# Patient Record
Sex: FEMALE | Race: WHITE | NOT HISPANIC OR LATINO | URBAN - METROPOLITAN AREA
[De-identification: names, ages, dates, MRNs, and addresses within clinical notes are randomized per-mention and may not be internally consistent; named-entity substitution may affect disease eponyms.]

---
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
## 2023-08-07 ENCOUNTER — OFFICE VISIT (OUTPATIENT)
Dept: OBSTETRICS AND GYNECOLOGY | Age: 18
End: 2023-08-07
Payer: COMMERCIAL

## 2023-08-07 VITALS
DIASTOLIC BLOOD PRESSURE: 68 MMHG | HEIGHT: 68 IN | WEIGHT: 174 LBS | BODY MASS INDEX: 26.37 KG/M2 | SYSTOLIC BLOOD PRESSURE: 110 MMHG

## 2023-08-07 DIAGNOSIS — N90.89 VULVAR LESION: ICD-10-CM

## 2023-08-07 DIAGNOSIS — Z01.419 ENCOUNTER FOR GYNECOLOGICAL EXAMINATION WITHOUT ABNORMAL FINDING: Primary | ICD-10-CM

## 2023-08-07 DIAGNOSIS — Z11.8 SCREENING FOR CHLAMYDIAL DISEASE: ICD-10-CM

## 2023-08-07 PROCEDURE — 99395 PREV VISIT EST AGE 18-39: CPT | Performed by: OBSTETRICS & GYNECOLOGY

## 2023-08-07 NOTE — PROGRESS NOTES
Routine Annual Visit    2023    Patient: Zoey Jaime          MR#:[id number]      Chief Complaint   Patient presents with    Gynecologic Exam     Annual Exam - last AE 22, pt would like to discuss IUD options, pt has no complaints today       History of Present Illness    17 y.o. female  who presents for annual exam.     Ocps, doing ok but skips a lot of pills, up to 5 a month  C/o paper cut type lesion around clitoral area, no cold sores  No lesion today but HSV would be in differential   Ok with chlamydia screen  3rd year in [employer] in [place]  Wants to switch to IUD      Patient's last menstrual period was 2023 (exact date).  Obstetric History:  OB History          0    Para   0    Term   0       0    AB   0    Living   0         SAB   0    IAB   0    Ectopic   0    Molar   0    Multiple   0    Live Births   0               Menstrual History:     Patient's last menstrual period was 2023 (exact date).       Sexual History:       ________________________________________  There is no problem list on file for this patient.      Past Medical History:   Diagnosis Date    Anxiety 2019       Past Surgical History:   Procedure Laterality Date    WISDOM TOOTH EXTRACTION  2022       Social History     Tobacco Use   Smoking Status Never   Smokeless Tobacco Never       has a current medication list which includes the following prescription(s): doxycycline and norgestimate-ethinyl estradiol.  ________________________________________    Current contraception: OCP (estrogen/progesterone)  History of abnormal Pap smear: no  Family history of Breast cancer: no         The following portions of the patient's history were reviewed and updated as appropriate: allergies, current medications, past family history, past medical history, past social history, past surgical history, and problem list.    Review of Systems    Pertinent items are noted in HPI.     Objective   Physical  "Exam    /68   Ht 172.7 cm (68\")   Wt 78.9 kg (174 lb)   LMP 08/02/2023 (Exact Date)   BMI 26.46 kg/mý    BP Readings from Last 3 Encounters:   08/07/23 110/68   08/01/22 118/64   02/26/21 104/62 (23 %, Z = -0.74 /  29 %, Z = -0.55)*     *BP percentiles are based on the 2017 AAP Clinical Practice Guideline for girls      Wt Readings from Last 3 Encounters:   08/07/23 78.9 kg (174 lb) (93 %, Z= 1.46)*   08/01/22 75.8 kg (167 lb) (91 %, Z= 1.37)*   02/26/21 76.7 kg (169 lb) (93 %, Z= 1.50)*     * Growth percentiles are based on [facility] (Girls, 2-20 Years) data.      BMI: Estimated body mass index is 26.46 kg/mý as calculated from the following:    Height as of this encounter: 172.7 cm (68\").    Weight as of this encounter: 78.9 kg (174 lb).      General:   alert, appears stated age, and cooperative   Abdomen: soft, non-tender, without masses or organomegaly   Breast: inspection negative, no nipple discharge or bleeding, no masses or nodularity palpable   Vulva: normal   Vagina: normal mucosa   Cervix: no cervical motion tenderness and no lesions   Uterus: normal size, non-tender, and deferred   Adnexa: deferred     Assessment:    1. Normal annual exam   Assessment     ICD-10-CM ICD-9-CM   1. Encounter for gynecological examination without abnormal finding  Z01.419 V72.31   2. Vulvar lesion  N90.89 624.8   3. Screening for chlamydial disease  Z11.8 V73.98     Plan:    Plan       []  PAP done  []  Labs:   []  GC/Chl/TV          Diagnoses and all orders for this visit:    1. Encounter for gynecological examination without abnormal finding (Primary)    2. Vulvar lesion  -     HSV 1 & 2 - Specific Antibody, IgG    3. Screening for chlamydial disease  -     Chlamydia trachomatis, Neisseria gonorrhoeae, Trichomonas vaginalis, PCR - Swab, Vagina      Follow up in a few days, CD 5 today, place IUD end of week      Counseling:  --Nutrition: Stressed importance of moderation and caloric balance, stressed fresh fruit and " vegetables  --Exercise: Stressed the importance of regular exercise. 3-5 times weekly       --Discussed pap smear screening recommendations

## 2023-08-08 LAB
HSV1 IGG SER IA-ACNC: <0.91 INDEX (ref 0–0.9)
HSV2 IGG SER IA-ACNC: <0.91 INDEX (ref 0–0.9)

## 2023-08-09 LAB
C TRACH RRNA SPEC QL NAA+PROBE: NEGATIVE
N GONORRHOEA RRNA SPEC QL NAA+PROBE: NEGATIVE
T VAGINALIS RRNA SPEC QL NAA+PROBE: NEGATIVE

## 2023-08-11 ENCOUNTER — OFFICE VISIT (OUTPATIENT)
Dept: OBSTETRICS AND GYNECOLOGY | Age: 18
End: 2023-08-11
Payer: COMMERCIAL

## 2023-08-11 VITALS
WEIGHT: 175 LBS | DIASTOLIC BLOOD PRESSURE: 84 MMHG | SYSTOLIC BLOOD PRESSURE: 132 MMHG | HEIGHT: 68 IN | BODY MASS INDEX: 26.52 KG/M2

## 2023-08-11 DIAGNOSIS — Z30.430 ENCOUNTER FOR IUD INSERTION: ICD-10-CM

## 2023-08-11 DIAGNOSIS — Z01.812 PRE-PROCEDURE LAB EXAM: Primary | ICD-10-CM

## 2023-08-11 DIAGNOSIS — Z97.5 IUD CONTRACEPTION: ICD-10-CM

## 2023-08-11 LAB
B-HCG UR QL: NEGATIVE
EXPIRATION DATE: NORMAL
INTERNAL NEGATIVE CONTROL: NORMAL
INTERNAL POSITIVE CONTROL: NORMAL
Lab: NORMAL

## 2023-08-11 NOTE — PROGRESS NOTES
"GYN Visit    2023    Patient: Zoey Jaime          MR#:[id number]      Chief Complaint   Patient presents with    Follow-up     Gyn F/u - Kyleena IUD insertion ([employer])       History of Present Illness    17 y.o. female  who presents for IUD insertion  She is not at risk for pregnancy and her pregnancy test is negative  She just finished her cycle, we decided to proceed due to leaving for college in [place] this next week          Patient's last menstrual period was 2023 (exact date).    ________________________________________  There is no problem list on file for this patient.      Past Medical History:   Diagnosis Date    Anxiety 2019       Past Surgical History:   Procedure Laterality Date    WISDOM TOOTH EXTRACTION  2022       Social History     Tobacco Use   Smoking Status Never   Smokeless Tobacco Never       has a current medication list which includes the following prescription(s): doxycycline and norgestimate-ethinyl estradiol.  ________________________________________    Current contraception: OCP (estrogen/progesterone)      The following portions of the patient's history were reviewed and updated as appropriate: allergies, current medications, past family history, past medical history, past social history, past surgical history, and problem list.    Review of Systems    Pertinent items are noted in HPI.     Objective   Physical Exam    /84   Ht 172.7 cm (68\")   Wt 79.4 kg (175 lb)   LMP 2023 (Exact Date)   BMI 26.61 kg/mý    BP Readings from Last 3 Encounters:   23 132/84   23 110/68   22 118/64      Wt Readings from Last 3 Encounters:   23 79.4 kg (175 lb) (93 %, Z= 1.49)*   23 78.9 kg (174 lb) (93 %, Z= 1.46)*   22 75.8 kg (167 lb) (91 %, Z= 1.37)*     * Growth percentiles are based on CDC (Girls, 2-20 Years) data.      BMI: Estimated body mass index is 26.61 kg/mý as calculated from the following:    Height as of this " "encounter: 172.7 cm (68\").    Weight as of this encounter: 79.4 kg (175 lb).    Procedures    IUD Insertion Procedure Note    Indication: Desires long acting reversible contraception     Procedure Details   Urine pregnancy test was done and was NEGATIVE .  The risks (including infection, bleeding, pain, and uterine perforation) and benefits of the procedure were explained to the patient and Written informed consent was obtained.      Cervix cleansed with Betadine. Uterus sounded to 7 cm.  IUD was placed at the os and insertion was attempted.  The patient showed severe discomfort.  The procedure was abandoned.  Ultrasound was ordered and the ultrasound was normal with a anteverted uterus.  I offered to repeat insertion attempt with a cervical block.  Patient decided to try this the very same day.  She was reprepped and a cervical cervical block was done with 1% lidocaine with epinephrine at 3, 12, 9 on the cervix.  Patient tolerated the procedure much better at this point.  IUD inserted without difficulty. String visible and trimmed.    IUD Information:  Kyleena.    Condition:  Stable    Complications:  None    Patient tolerated the procedure with difficulty.    Plan:  The patient was advised to call for any fever or for prolonged or severe pain or bleeding. She was advised to use OTC ibuprofen as needed for mild to moderate pain.     Attending Physician Documentation:  I was present for the entire procedure.    Terra Juarez MD  8/11/2023  14:50 EDT  Assessment:      Diagnoses and all orders for this visit:    1. Pre-procedure lab exam (Primary)  -     POC Pregnancy, Urine    2. Encounter for IUD insertion    3. IUD contraception  -     levonorgestrel (KYLEENA) 19.5 MG IUD 1 each    A Kyleena IUD had to be wasted due to to severe discomfort with original insertion.  Second attempt at insertion went well.  Patient will follow-up in a few months for an IUD check.          "

## 2023-08-15 ENCOUNTER — TELEPHONE (OUTPATIENT)
Facility: HOSPITAL | Age: 18
End: 2023-08-15
Payer: COMMERCIAL

## 2023-08-15 RX ORDER — NITROFURANTOIN 25; 75 MG/1; MG/1
100 CAPSULE ORAL 2 TIMES DAILY
Qty: 14 CAPSULE | Refills: 2 | Status: SHIPPED | OUTPATIENT
Start: 2023-08-15
